# Patient Record
Sex: MALE | Race: WHITE | NOT HISPANIC OR LATINO | URBAN - METROPOLITAN AREA
[De-identification: names, ages, dates, MRNs, and addresses within clinical notes are randomized per-mention and may not be internally consistent; named-entity substitution may affect disease eponyms.]

---

## 2023-08-02 ENCOUNTER — EMERGENCY (EMERGENCY)
Facility: HOSPITAL | Age: 48
LOS: 1 days | Discharge: HOME | End: 2023-08-02
Attending: EMERGENCY MEDICINE
Payer: COMMERCIAL

## 2023-08-02 VITALS
DIASTOLIC BLOOD PRESSURE: 81 MMHG | SYSTOLIC BLOOD PRESSURE: 122 MMHG | RESPIRATION RATE: 16 BRPM | WEIGHT: 315 LBS | OXYGEN SATURATION: 100 % | HEIGHT: 69 IN | HEART RATE: 78 BPM | TEMPERATURE: 98 F

## 2023-08-02 VITALS
HEIGHT: 69 IN | SYSTOLIC BLOOD PRESSURE: 135 MMHG | DIASTOLIC BLOOD PRESSURE: 65 MMHG | WEIGHT: 175.05 LBS | HEART RATE: 67 BPM | OXYGEN SATURATION: 97 % | TEMPERATURE: 98 F | RESPIRATION RATE: 18 BRPM

## 2023-08-02 VITALS
TEMPERATURE: 98 F | OXYGEN SATURATION: 99 % | SYSTOLIC BLOOD PRESSURE: 132 MMHG | RESPIRATION RATE: 18 BRPM | HEART RATE: 80 BPM | DIASTOLIC BLOOD PRESSURE: 77 MMHG

## 2023-08-02 LAB
ALBUMIN SERPL ELPH-MCNC: 4.6 G/DL — SIGNIFICANT CHANGE UP (ref 3.3–5.2)
ALP SERPL-CCNC: 61 U/L — SIGNIFICANT CHANGE UP (ref 40–120)
ALT FLD-CCNC: 10 U/L — SIGNIFICANT CHANGE UP
ANION GAP SERPL CALC-SCNC: 16 MMOL/L — SIGNIFICANT CHANGE UP (ref 5–17)
AST SERPL-CCNC: 21 U/L — SIGNIFICANT CHANGE UP
BASOPHILS # BLD AUTO: 0.03 K/UL — SIGNIFICANT CHANGE UP (ref 0–0.2)
BASOPHILS NFR BLD AUTO: 0.7 % — SIGNIFICANT CHANGE UP (ref 0–2)
BILIRUB SERPL-MCNC: 0.5 MG/DL — SIGNIFICANT CHANGE UP (ref 0.4–2)
BUN SERPL-MCNC: 15 MG/DL — SIGNIFICANT CHANGE UP (ref 8–20)
CALCIUM SERPL-MCNC: 9.4 MG/DL — SIGNIFICANT CHANGE UP (ref 8.4–10.5)
CHLORIDE SERPL-SCNC: 100 MMOL/L — SIGNIFICANT CHANGE UP (ref 96–108)
CO2 SERPL-SCNC: 24 MMOL/L — SIGNIFICANT CHANGE UP (ref 22–29)
CREAT SERPL-MCNC: 0.81 MG/DL — SIGNIFICANT CHANGE UP (ref 0.5–1.3)
EGFR: 109 ML/MIN/1.73M2 — SIGNIFICANT CHANGE UP
EOSINOPHIL # BLD AUTO: 0.04 K/UL — SIGNIFICANT CHANGE UP (ref 0–0.5)
EOSINOPHIL NFR BLD AUTO: 0.9 % — SIGNIFICANT CHANGE UP (ref 0–6)
GLUCOSE SERPL-MCNC: 90 MG/DL — SIGNIFICANT CHANGE UP (ref 70–99)
HCT VFR BLD CALC: 42.4 % — SIGNIFICANT CHANGE UP (ref 39–50)
HGB BLD-MCNC: 14.7 G/DL — SIGNIFICANT CHANGE UP (ref 13–17)
IMM GRANULOCYTES NFR BLD AUTO: 0.4 % — SIGNIFICANT CHANGE UP (ref 0–0.9)
LYMPHOCYTES # BLD AUTO: 1.2 K/UL — SIGNIFICANT CHANGE UP (ref 1–3.3)
LYMPHOCYTES # BLD AUTO: 26.5 % — SIGNIFICANT CHANGE UP (ref 13–44)
MCHC RBC-ENTMCNC: 31.8 PG — SIGNIFICANT CHANGE UP (ref 27–34)
MCHC RBC-ENTMCNC: 34.7 GM/DL — SIGNIFICANT CHANGE UP (ref 32–36)
MCV RBC AUTO: 91.8 FL — SIGNIFICANT CHANGE UP (ref 80–100)
MONOCYTES # BLD AUTO: 0.3 K/UL — SIGNIFICANT CHANGE UP (ref 0–0.9)
MONOCYTES NFR BLD AUTO: 6.6 % — SIGNIFICANT CHANGE UP (ref 2–14)
NEUTROPHILS # BLD AUTO: 2.93 K/UL — SIGNIFICANT CHANGE UP (ref 1.8–7.4)
NEUTROPHILS NFR BLD AUTO: 64.9 % — SIGNIFICANT CHANGE UP (ref 43–77)
PLATELET # BLD AUTO: 176 K/UL — SIGNIFICANT CHANGE UP (ref 150–400)
POTASSIUM SERPL-MCNC: 4.2 MMOL/L — SIGNIFICANT CHANGE UP (ref 3.5–5.3)
POTASSIUM SERPL-SCNC: 4.2 MMOL/L — SIGNIFICANT CHANGE UP (ref 3.5–5.3)
PROT SERPL-MCNC: 6.7 G/DL — SIGNIFICANT CHANGE UP (ref 6.6–8.7)
RBC # BLD: 4.62 M/UL — SIGNIFICANT CHANGE UP (ref 4.2–5.8)
RBC # FLD: 12.4 % — SIGNIFICANT CHANGE UP (ref 10.3–14.5)
SODIUM SERPL-SCNC: 140 MMOL/L — SIGNIFICANT CHANGE UP (ref 135–145)
WBC # BLD: 4.52 K/UL — SIGNIFICANT CHANGE UP (ref 3.8–10.5)
WBC # FLD AUTO: 4.52 K/UL — SIGNIFICANT CHANGE UP (ref 3.8–10.5)

## 2023-08-02 PROCEDURE — L9997: CPT

## 2023-08-02 PROCEDURE — 96374 THER/PROPH/DIAG INJ IV PUSH: CPT

## 2023-08-02 PROCEDURE — 36415 COLL VENOUS BLD VENIPUNCTURE: CPT

## 2023-08-02 PROCEDURE — 93010 ELECTROCARDIOGRAM REPORT: CPT

## 2023-08-02 PROCEDURE — 80053 COMPREHEN METABOLIC PANEL: CPT

## 2023-08-02 PROCEDURE — 85025 COMPLETE CBC W/AUTO DIFF WBC: CPT

## 2023-08-02 PROCEDURE — 80157 ASSAY CARBAMAZEPINE FREE: CPT

## 2023-08-02 PROCEDURE — 99283 EMERGENCY DEPT VISIT LOW MDM: CPT

## 2023-08-02 PROCEDURE — 71045 X-RAY EXAM CHEST 1 VIEW: CPT

## 2023-08-02 PROCEDURE — 93005 ELECTROCARDIOGRAM TRACING: CPT

## 2023-08-02 PROCEDURE — 99284 EMERGENCY DEPT VISIT MOD MDM: CPT | Mod: 25

## 2023-08-02 PROCEDURE — 96375 TX/PRO/DX INJ NEW DRUG ADDON: CPT

## 2023-08-02 PROCEDURE — 71045 X-RAY EXAM CHEST 1 VIEW: CPT | Mod: 26

## 2023-08-02 PROCEDURE — 99284 EMERGENCY DEPT VISIT MOD MDM: CPT

## 2023-08-02 RX ORDER — ONDANSETRON 8 MG/1
4 TABLET, FILM COATED ORAL ONCE
Refills: 0 | Status: COMPLETED | OUTPATIENT
Start: 2023-08-02 | End: 2023-08-02

## 2023-08-02 RX ORDER — SODIUM CHLORIDE 9 MG/ML
1000 INJECTION INTRAMUSCULAR; INTRAVENOUS; SUBCUTANEOUS ONCE
Refills: 0 | Status: COMPLETED | OUTPATIENT
Start: 2023-08-02 | End: 2023-08-02

## 2023-08-02 RX ORDER — LEVETIRACETAM 250 MG/1
1000 TABLET, FILM COATED ORAL ONCE
Refills: 0 | Status: COMPLETED | OUTPATIENT
Start: 2023-08-02 | End: 2023-08-02

## 2023-08-02 RX ADMIN — ONDANSETRON 4 MILLIGRAM(S): 8 TABLET, FILM COATED ORAL at 15:08

## 2023-08-02 RX ADMIN — LEVETIRACETAM 400 MILLIGRAM(S): 250 TABLET, FILM COATED ORAL at 14:51

## 2023-08-02 RX ADMIN — SODIUM CHLORIDE 1000 MILLILITER(S): 9 INJECTION INTRAMUSCULAR; INTRAVENOUS; SUBCUTANEOUS at 14:52

## 2023-08-02 NOTE — ED PROVIDER NOTE - PHYSICAL EXAMINATION
Gen: NAD  Head: NC/AT  Neck: trachea midline  Card: regular rate and rhythm  Resp:  CTAB  Abd: soft, non-distended, non-tender  Ext: no deformities above reported baseline  Neuro:  Awake, responsive to voice, oriented to person. moving all extremities spontaneously. good strength and sensation in all extremities, PERRL, EOMI, CN II-XII intact.  Skin:  Warm and dry as visualized  Psych:  Normal affect and mood

## 2023-08-02 NOTE — ED ADULT NURSE NOTE - CHIEF COMPLAINT QUOTE
pt found under the tree in hospital parking lot. rapid response called. discharged from [facility] earlier. denies head strike, blood thinners. wife witnessed seizure lasting less than 1 min.

## 2023-08-02 NOTE — ED ADULT TRIAGE NOTE - CHIEF COMPLAINT QUOTE
pt found under the tree in hospital parking lot. rapid response called. discharged from [facility] earlier. denies head strike, blood thinners. wife witnessed seizure lasting less than 1 min.

## 2023-08-02 NOTE — ED PROVIDER NOTE - PATIENT PORTAL LINK FT
You can access the FollowMyHealth Patient Portal offered by [facility] by registering at the following website: [email or web address]. By joining [employer]’s FollowMyHealth portal, you will also be able to view your health information using other applications (apps) compatible with our system.

## 2023-08-02 NOTE — ED PROVIDER NOTE - CLINICAL SUMMARY MEDICAL DECISION MAKING FREE TEXT BOX
Pt returned to baseline, neuro intact, well appearing, labs reviewed, wishes to go home and f/u with his neuro

## 2023-08-02 NOTE — ED ADULT NURSE NOTE - OBJECTIVE STATEMENT
Pt awake, alert, and mild postictal stage. Respirations are even and unlabored. Color is appropriate for race. Skin warm and dry. Pt does not recall what happened this morning. "I think my wife called the ambulance". Pt has hx of seizures. Pt is compliant with his seizures medications and states that he took them this morning. PIV placed and labs sent to lab. Cardiac monitor in place. No obvious injuries or deformities noted. Pt has steady gait. ED MD evaluating, will monitor.

## 2023-08-02 NOTE — ED PROVIDER NOTE - PROGRESS NOTE DETAILS
Initially upon presentation, pt reported that he was on tegretol. A level was sent off in order to expedite his outpatient neurology follow up. However, once pt was no longer in post-ictal state, this is an old medication, pt is no longer on this, and level would be expedited ot be zero.

## 2023-08-02 NOTE — ED ADULT NURSE NOTE - AS PAIN REST
Weight Management Class    Visit Vitals   5' 5\" (1.651 m)   Wt 92.3 kg (203 lb 8 oz)   BMI 33.86 kg/m²         Patient attended Weight Management Class. Several weight reduction strategies were discussed: Go, Slow, Whoa; The Plate Method and Counting Calories. Handouts were provided. Group participated in sharing successes and challenges.     New topics discussed:Eating more non starchy vegetables and getting adequate sleep.    Patient will attend the next Weight Management Class in one month. The phone number of the Dietitian was given and patient was advised to call if questions arise.    Babs Clifford, RD  
0 (no pain/absence of nonverbal indicators of pain)

## 2023-08-02 NOTE — ED ADULT NURSE NOTE - OBJECTIVE STATEMENT
pt reports to the ED with seizure like activity. Pt was seen previously in the ED on this date, and was d/c home. pt was following wife to car where he experienced seizure like activity in the parking lot. Wife carry's intranasal diazepam which she administered in the parking lot. Rapid response called, pt brought to CC for further eval. pt did not fall or hit head. Pt post ictal in ED. placed on CM, pt reports to the ED with seizure like activity. Pt was seen previously in the ED on this date, and was d/c home. pt was following wife to car where he experienced seizure like activity in the parking lot lasting less than 1 minute. Wife carry's intranasal diazepam which she administered in the parking lot. Rapid response called, pt brought to [facility] for further eval. pt did not fall or hit head. Pt post ictal in ED. placed on CM,

## 2023-08-02 NOTE — ED PROVIDER NOTE - CLINICAL SUMMARY MEDICAL DECISION MAKING FREE TEXT BOX
Pt d/c from the ED after declining seizure meds has seizure like activity in the parking lot. Lab work done prior to d/c. Keppra 1g and IV fluids ordered.

## 2023-08-02 NOTE — ED PROVIDER NOTE - PATIENT PORTAL LINK FT
You can access the FollowMyHealth Patient Portal offered by [facility] by registering at the following website: [email or web address]. By joining [employer]’s FollowMyHealth portal, you will also be able to view your health information using other applications (apps) compatible with our system.

## 2023-08-02 NOTE — ED ADULT NURSE REASSESSMENT NOTE - NS ED NURSE REASSESS COMMENT FT1
Assumed care of pt from critical care RN. Pt is resting comfortably in stretcher. NAD. Pt is A&Ox3. Respirations are even and unlabored. Color is appropriate for race. Pt updated on plan of care. Will monitor.

## 2023-08-02 NOTE — ED PROVIDER NOTE - OBJECTIVE STATEMENT
48 year old male with PMH seizures presents with seizure. Pt had a witnessed seizure while at work today. Did not hit head, seizure stopped without any need for medications. Pt state she currently feels well, denies headache, fevers, chills, abd pain, blurry vision. Pt reports he gets breakthrough seizures "sporadically", has good f/u with neuro at [facility], denies any missed doses of medictaion.

## 2023-08-02 NOTE — ED PROVIDER NOTE - ATTENDING CONTRIBUTION TO CARE
I, Jethro Swann, performed a face to face bedside interview with this patient regarding history of present illness, review of symptoms and relevant past medical, social and family history.  I completed an independent physical examination. I have communicated the patient’s plan of care and disposition with the resident.  48 year old male with pmh seizure disorder presents with a seizure. Pt was seen here earlier today for seizure. Pt has long standing Hx of seizure disorder, being treated with keppra 500 gm BID, vimpat 200 mg TID, lamotrigine 300 mg in am and 200 mg pm. Pt has a Hx of poor compliance with medication, and pt and family unsure if he took am meds. Pt was seen earlier., discharged, and when he was walking to the car started to act confused, like he does when he nromally has a seixure, Wife laid him down, no grand mal, but reported twitching.  Gen: NAD, well appearing  CV: RRR  Pul: CTA b/l  Abd: Soft, non-distended, non-tender  Neuro: no focal deficits  Pt improved, meds given, back to baseline, given the fact that pt has had 2 visits in 1 day offered neuro consult and admission, but both wife and pt request dc

## 2023-08-02 NOTE — ED PROVIDER NOTE - TEMPLATE, MLM
Recommend patient to see DR. Mary Jo Scott referral with previous breasts diagnosis. She can also review any other issues with him at that time. Neuro General

## 2023-08-02 NOTE — ED ADULT TRIAGE NOTE - CHIEF COMPLAINT QUOTE
Pt BIBA from work after having a seizure. Pt with h/o seizures and has valium on him to use. Pt postictal at this time. As per wife, he gets very confused after his seizures.

## 2023-08-02 NOTE — ED ADULT NURSE NOTE - NSFALLUNIVINTERV_ED_ALL_ED
Bed/Stretcher in lowest position, wheels locked, appropriate side rails in place/Call bell, personal items and telephone in reach/Instruct patient to call for assistance before getting out of bed/chair/stretcher/Non-slip footwear applied when patient is off stretcher/[place] to call system/Physically safe environment - no spills, clutter or unnecessary equipment/Purposeful proactive rounding/Room/bathroom lighting operational, light cord in reach

## 2023-08-05 LAB — CARBAMAZEPINE, FREE: <0.2 MCG/ML — LOW (ref 1–3)

## 2023-09-14 ENCOUNTER — EMERGENCY (EMERGENCY)
Facility: HOSPITAL | Age: 48
LOS: 1 days | Discharge: HOME | End: 2023-09-14
Attending: EMERGENCY MEDICINE
Payer: COMMERCIAL

## 2023-09-14 VITALS
OXYGEN SATURATION: 95 % | SYSTOLIC BLOOD PRESSURE: 133 MMHG | HEIGHT: 69 IN | TEMPERATURE: 98 F | DIASTOLIC BLOOD PRESSURE: 80 MMHG | WEIGHT: 235.01 LBS | HEART RATE: 82 BPM | RESPIRATION RATE: 15 BRPM

## 2023-09-14 VITALS
HEART RATE: 83 BPM | OXYGEN SATURATION: 97 % | SYSTOLIC BLOOD PRESSURE: 116 MMHG | RESPIRATION RATE: 20 BRPM | DIASTOLIC BLOOD PRESSURE: 85 MMHG

## 2023-09-14 LAB
ALBUMIN SERPL ELPH-MCNC: 4.6 G/DL — SIGNIFICANT CHANGE UP (ref 3.3–5.2)
ALP SERPL-CCNC: 51 U/L — SIGNIFICANT CHANGE UP (ref 40–120)
ALT FLD-CCNC: 9 U/L — SIGNIFICANT CHANGE UP
ANION GAP SERPL CALC-SCNC: 12 MMOL/L — SIGNIFICANT CHANGE UP (ref 5–17)
AST SERPL-CCNC: 20 U/L — SIGNIFICANT CHANGE UP
BILIRUB SERPL-MCNC: 0.3 MG/DL — LOW (ref 0.4–2)
BUN SERPL-MCNC: 11.9 MG/DL — SIGNIFICANT CHANGE UP (ref 8–20)
CALCIUM SERPL-MCNC: 9.3 MG/DL — SIGNIFICANT CHANGE UP (ref 8.4–10.5)
CHLORIDE SERPL-SCNC: 102 MMOL/L — SIGNIFICANT CHANGE UP (ref 96–108)
CO2 SERPL-SCNC: 27 MMOL/L — SIGNIFICANT CHANGE UP (ref 22–29)
CREAT SERPL-MCNC: 0.69 MG/DL — SIGNIFICANT CHANGE UP (ref 0.5–1.3)
EGFR: 114 ML/MIN/1.73M2 — SIGNIFICANT CHANGE UP
GLUCOSE SERPL-MCNC: 93 MG/DL — SIGNIFICANT CHANGE UP (ref 70–99)
HCT VFR BLD CALC: 42.9 % — SIGNIFICANT CHANGE UP (ref 39–50)
HGB BLD-MCNC: 14.6 G/DL — SIGNIFICANT CHANGE UP (ref 13–17)
MCHC RBC-ENTMCNC: 31.3 PG — SIGNIFICANT CHANGE UP (ref 27–34)
MCHC RBC-ENTMCNC: 34 GM/DL — SIGNIFICANT CHANGE UP (ref 32–36)
MCV RBC AUTO: 92.1 FL — SIGNIFICANT CHANGE UP (ref 80–100)
PLATELET # BLD AUTO: 158 K/UL — SIGNIFICANT CHANGE UP (ref 150–400)
POTASSIUM SERPL-MCNC: 4.4 MMOL/L — SIGNIFICANT CHANGE UP (ref 3.5–5.3)
POTASSIUM SERPL-SCNC: 4.4 MMOL/L — SIGNIFICANT CHANGE UP (ref 3.5–5.3)
PROT SERPL-MCNC: 6.6 G/DL — SIGNIFICANT CHANGE UP (ref 6.6–8.7)
RBC # BLD: 4.66 M/UL — SIGNIFICANT CHANGE UP (ref 4.2–5.8)
RBC # FLD: 11.9 % — SIGNIFICANT CHANGE UP (ref 10.3–14.5)
SODIUM SERPL-SCNC: 140 MMOL/L — SIGNIFICANT CHANGE UP (ref 135–145)
VALPROATE SERPL-MCNC: 29.8 UG/ML — LOW (ref 50–100)
WBC # BLD: 6.4 K/UL — SIGNIFICANT CHANGE UP (ref 3.8–10.5)
WBC # FLD AUTO: 6.4 K/UL — SIGNIFICANT CHANGE UP (ref 3.8–10.5)

## 2023-09-14 PROCEDURE — 99285 EMERGENCY DEPT VISIT HI MDM: CPT

## 2023-09-14 PROCEDURE — 99291 CRITICAL CARE FIRST HOUR: CPT

## 2023-09-14 PROCEDURE — 93010 ELECTROCARDIOGRAM REPORT: CPT

## 2023-09-14 PROCEDURE — 70450 CT HEAD/BRAIN W/O DYE: CPT | Mod: 26,MA

## 2023-09-14 RX ORDER — DIVALPROEX SODIUM 500 MG/1
750 TABLET, DELAYED RELEASE ORAL ONCE
Refills: 0 | Status: COMPLETED | OUTPATIENT
Start: 2023-09-14 | End: 2023-09-14

## 2023-09-14 RX ORDER — LAMOTRIGINE 25 MG/1
200 TABLET, ORALLY DISINTEGRATING ORAL ONCE
Refills: 0 | Status: COMPLETED | OUTPATIENT
Start: 2023-09-14 | End: 2023-09-14

## 2023-09-14 RX ORDER — VALPROIC ACID (AS SODIUM SALT) 250 MG/5ML
1000 SOLUTION, ORAL ORAL ONCE
Refills: 0 | Status: COMPLETED | OUTPATIENT
Start: 2023-09-14 | End: 2023-09-14

## 2023-09-14 RX ORDER — LEVETIRACETAM 250 MG/1
1000 TABLET, FILM COATED ORAL ONCE
Refills: 0 | Status: DISCONTINUED | OUTPATIENT
Start: 2023-09-14 | End: 2023-09-14

## 2023-09-14 RX ORDER — LEVETIRACETAM 250 MG/1
3000 TABLET, FILM COATED ORAL ONCE
Refills: 0 | Status: COMPLETED | OUTPATIENT
Start: 2023-09-14 | End: 2023-09-14

## 2023-09-14 RX ORDER — SODIUM CHLORIDE 9 MG/ML
1000 INJECTION INTRAMUSCULAR; INTRAVENOUS; SUBCUTANEOUS ONCE
Refills: 0 | Status: COMPLETED | OUTPATIENT
Start: 2023-09-14 | End: 2023-09-14

## 2023-09-14 RX ADMIN — Medication 4 MILLIGRAM(S): at 17:05

## 2023-09-14 RX ADMIN — SODIUM CHLORIDE 1000 MILLILITER(S): 9 INJECTION INTRAMUSCULAR; INTRAVENOUS; SUBCUTANEOUS at 17:49

## 2023-09-14 RX ADMIN — Medication 60 MILLIGRAM(S): at 20:52

## 2023-09-14 RX ADMIN — LAMOTRIGINE 200 MILLIGRAM(S): 25 TABLET, ORALLY DISINTEGRATING ORAL at 21:31

## 2023-09-14 RX ADMIN — DIVALPROEX SODIUM 750 MILLIGRAM(S): 500 TABLET, DELAYED RELEASE ORAL at 21:31

## 2023-09-14 RX ADMIN — LEVETIRACETAM 3000 MILLIGRAM(S): 250 TABLET, FILM COATED ORAL at 17:47

## 2023-09-14 NOTE — ED PROVIDER NOTE - PATIENT PORTAL LINK FT
You can access the FollowMyHealth Patient Portal offered by [facility] by registering at the following website: [email or web address]. By joining [employer]’s FollowMyHealth portal, you will also be able to view your health information using other applications (apps) compatible with our system.

## 2023-09-14 NOTE — ED ADULT TRIAGE NOTE - CHIEF COMPLAINT QUOTE
Pt was at work today when he had a seizure, pt is lethargic and slow to respond, pt is A&O4, but is having problems with recalling prior to the event or immediately following the event, pt has a known seizure hx and takes lamotrigine, Vimpat and Depakote ER, pt did not take his 2pm dose of Vimpat due to his seizure he had, pt cannot appropriately recall his medical hx or his medication, the medication list was provided by the pts wife who called in.

## 2023-09-14 NOTE — ED ADULT NURSE REASSESSMENT NOTE - BREATHING
Patient notified of Dr. Sullivan's advice.    Patient is asking for a phone call from Dr. Sullivan, stating he needs to speak directly to Dr. Sullivan.  Patient was advised that Dr. Sullivan is seeing patients but the message will be sent to him.  Patient states the best number to reach him is [phone].    Routed to Dr. Sullivan.     spontaneous/unlabored
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
## 2023-09-14 NOTE — ED PROVIDER NOTE - PHYSICAL EXAMINATION
Gen: NAD, AOx1- somnolent arouses to voice   Head: NCAT  HEENT: EOMI, oral mucosa moist, normal conjunctiva, neck supple  Lung: CTAB, no respiratory distress  CV: rrr, no murmur, Normal perfusion  Abd: soft, NTND  MSK: No edema, no visible deformities  Neuro: moving UE/LE equally unable to follow commands   Skin: No rash   Psych: unable to assess

## 2023-09-14 NOTE — CONSULT NOTE ADULT - ASSESSMENT
ASSESSMENT/PLAN:  48y old  Male who presents with a chief complaint of seizures    Discussed patients seizure history and current ASM regimen with wife. He follows at [facility] with Dr. Gerson Zavala at [facility]. He had an EEG on Monday and has a follow up appointment next Monday. They are currently tapering his meds. His VPA level is low so we recommend loading with IV VPA 1g now. Restart his home regimen when taking PO.    Continue home regimen:  7AM-  LTG 250mg  LCS 200mg  2pm -    7PM -  LTG 200mg  Depakote ER 750mg    No ICU needs at this time. Hemodynamically stable. Improving exam.

## 2023-09-14 NOTE — ED ADULT NURSE NOTE - SEIZURE OBSERVATIONS
cyanotic/medication given/MD notified/oxygen initiated/patient post-ictal/safety evaluated and maintained

## 2023-09-14 NOTE — ED ADULT NURSE NOTE - OBJECTIVE STATEMENT
pt presents a &o x1 after witnessed seizure at work. Pt poor historian, wife at bedside. While this RN was establishing IV for IV antiseizure medications, pt began status epilepticus. Non rebreather applied, pt turned on side. Pt transported to [facility] for further care. Provider at bedside.

## 2023-09-14 NOTE — ED PROVIDER NOTE - NSFOLLOWUPINSTRUCTIONS_ED_ALL_ED_FT
Contineu taking home meds  Follow-up with your neurologist  You may return anytime to the ED if you feel unsafe or feel worse.

## 2023-09-14 NOTE — ED PROVIDER NOTE - OBJECTIVE STATEMENT
49 y/o male with significant past medical history of epilepsy on AED polytherapy Lamotrigine, Vimpat and Depakote ER confirmed via telephone by wife s/p seizure. Unable to obtain any additional subjective information from patient as he is a poor historian. During physical exam, patient recurrently yawning. ANOx2 at this time, unable to disclose the year. Patient c/o lightheadedness and feeling tired, high suspicion that patient is still post-ictal. 49 y/o male with significant past medical history of epilepsy on AED polytherapy Lamotrigine 250am 200pm, Vimpat 200 BID  and Depakote  PM confirmed via telephone by wife s/p seizure. Unable to obtain any additional subjective information from patient as he is a poor historian. During physical exam, patient recurrently yawning. ANOx2 at this time, unable to disclose the year. Patient c/o lightheadedness and feeling tired, high suspicion that patient is still post-ictal.
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
## 2023-09-14 NOTE — ED PROVIDER NOTE - CLINICAL SUMMARY MEDICAL DECISION MAKING FREE TEXT BOX
49 y/o male with significant past medical history of epilepsy on AED polytherapy Lamotrigine, Vimpat and Depakote ER (confirmed via telephone by wife) s/p seizure. Unable to obtain any additional subjective information from patient as he is a poor historian. ANOx2 at this time, unable to disclose the year. Patient c/o lightheadedness and feeling tired, high suspicion that patient is still post-ictal. EKG, BGL for now.

## 2023-09-14 NOTE — CONSULT NOTE ADULT - SUBJECTIVE AND OBJECTIVE BOX
Chief complaint:   Patient is a 48y old  Male who presents with a chief complaint of seizures    47 y/o male with significant past medical history of epilepsy on AED polytherapy Lamotrigine 250am 200pm, Vimpat 200 BID  and Depakote  PM confirmed via telephone by wife s/p seizure. Unable to obtain any additional subjective information from patient as he is a poor historian. During physical exam, patient recurrently yawning. ANOx2 at this time, unable to disclose the year. Patient c/o lightheadedness and feeling tired, high suspicion that patient is still post-ictal.    ROS: [ x]  Unable to assess due to mental status   All other systems negative    -----------------------------------------------------------------------------------------------------------------------------------------------------------------------------------  ICU Vital Signs Last 24 Hrs  T(C): 36.5 (14 Sep 2023 15:01), Max: 36.5 (14 Sep 2023 15:01)  T(F): 97.7 (14 Sep 2023 15:01), Max: 97.7 (14 Sep 2023 15:01)  HR: 82 (14 Sep 2023 19:31) (82 - 82)  BP: 108/64 (14 Sep 2023 19:31) (108/64 - 133/80)  BP(mean): --  ABP: --  ABP(mean): --  RR: 16 (14 Sep 2023 19:31) (15 - 16)  SpO2: 99% (14 Sep 2023 19:31) (95% - 99%)    O2 Parameters below as of 14 Sep 2023 19:31  Patient On (Oxygen Delivery Method): nasal cannula  O2 Flow (L/min): 3    I&O's Summary        IMAGING:   Recent imaging studies were reviewed.    LAB RESULTS:                          14.6   6.40  )-----------( 158      ( 14 Sep 2023 16:57 )             42.9           09-14    140  |  102  |  11.9  ----------------------------<  93  4.4   |  27.0  |  0.69    Ca    9.3      14 Sep 2023 16:57    TPro  6.6  /  Alb  4.6  /  TBili  0.3<L>  /  DBili  x   /  AST  20  /  ALT  9   /  AlkPhos  51  09-14      -----------------------------------------------------------------------------------------------------------------------------------------------------------------------------------    PHYSICAL EXAM:  General: Calm, drowsy  post-ativan and keppra  HEENT: MMM  Neuro:  -Mental status- No acute distress, AOx1, following commands  -CN- PERRL 3mm, EOMI, tongue midline, face symmetric  -Motor- antigravity in all ext  -Sensation- intact to LT   -Coordination- no dysmetria noted    CV: RRR  Pulm: Clear to auscultation  Abd: Soft, nontender, nondistended  Ext: No edema  Skin: warm, dry

## 2023-09-14 NOTE — ED PROVIDER NOTE - ATTENDING CONTRIBUTION TO CARE
patient critically ill requiring medications with intensive monitoring, discussion with consultants, discussion with patient and family, interpretation of diagnostic studies.     I, Nydia Mnotano, have personally seen and examined this patient. I have fully participated in the care of this patient. I have reviewed all pertinent clinical information, including history, physical exam, plan and the Resident's note and agree except as noted below.     48-year-old male with history of seizure disorder.  Had a seizure at work remained postictal in the ED and then had recurrent 2 back-to-back seizures without return to baseline.  Patient is in status epilepticus.  given 4 mg of Ativan and 3 g Keppra load.  Patient remains postictal.  Neuro ICU and neuro consulted.  During patient's seizure activity wife was present and became very upset over his care.  Became verbally aggressive and had to be removed by security.  Charge RN aware.

## 2023-09-15 PROCEDURE — 96375 TX/PRO/DX INJ NEW DRUG ADDON: CPT

## 2023-09-15 PROCEDURE — 82962 GLUCOSE BLOOD TEST: CPT

## 2023-09-15 PROCEDURE — 82550 ASSAY OF CK (CPK): CPT

## 2023-09-15 PROCEDURE — 70450 CT HEAD/BRAIN W/O DYE: CPT | Mod: MA

## 2023-09-15 PROCEDURE — 85027 COMPLETE CBC AUTOMATED: CPT

## 2023-09-15 PROCEDURE — 36415 COLL VENOUS BLD VENIPUNCTURE: CPT

## 2023-09-15 PROCEDURE — 80053 COMPREHEN METABOLIC PANEL: CPT

## 2023-09-15 PROCEDURE — 93005 ELECTROCARDIOGRAM TRACING: CPT

## 2023-09-15 PROCEDURE — 99285 EMERGENCY DEPT VISIT HI MDM: CPT | Mod: 25

## 2023-09-15 PROCEDURE — 80164 ASSAY DIPROPYLACETIC ACD TOT: CPT

## 2023-09-15 PROCEDURE — 96374 THER/PROPH/DIAG INJ IV PUSH: CPT
